# Patient Record
Sex: MALE | Race: WHITE | NOT HISPANIC OR LATINO | Employment: FULL TIME | URBAN - METROPOLITAN AREA
[De-identification: names, ages, dates, MRNs, and addresses within clinical notes are randomized per-mention and may not be internally consistent; named-entity substitution may affect disease eponyms.]

---

## 2023-04-28 LAB
ALANINE AMINOTRANSFERASE (SGPT) (U/L) IN SER/PLAS: 21 U/L (ref 10–52)
ALBUMIN (G/DL) IN SER/PLAS: 4.4 G/DL (ref 3.4–5)
ALKALINE PHOSPHATASE (U/L) IN SER/PLAS: 86 U/L (ref 33–120)
ANION GAP IN SER/PLAS: 10 MMOL/L (ref 10–20)
ASPARTATE AMINOTRANSFERASE (SGOT) (U/L) IN SER/PLAS: 12 U/L (ref 9–39)
BILIRUBIN TOTAL (MG/DL) IN SER/PLAS: 1.2 MG/DL (ref 0–1.2)
CALCIUM (MG/DL) IN SER/PLAS: 9.3 MG/DL (ref 8.6–10.3)
CARBON DIOXIDE, TOTAL (MMOL/L) IN SER/PLAS: 30 MMOL/L (ref 21–32)
CHLORIDE (MMOL/L) IN SER/PLAS: 103 MMOL/L (ref 98–107)
CHOLESTEROL (MG/DL) IN SER/PLAS: 89 MG/DL (ref 0–199)
CHOLESTEROL IN HDL (MG/DL) IN SER/PLAS: 34.9 MG/DL
CHOLESTEROL/HDL RATIO: 2.6
CREATININE (MG/DL) IN SER/PLAS: 1 MG/DL (ref 0.5–1.3)
GFR MALE: 89 ML/MIN/1.73M2
GLUCOSE (MG/DL) IN SER/PLAS: 184 MG/DL (ref 74–99)
LDL: 36 MG/DL (ref 0–99)
POTASSIUM (MMOL/L) IN SER/PLAS: 4.4 MMOL/L (ref 3.5–5.3)
PROTEIN TOTAL: 6.5 G/DL (ref 6.4–8.2)
SODIUM (MMOL/L) IN SER/PLAS: 139 MMOL/L (ref 136–145)
TRIGLYCERIDE (MG/DL) IN SER/PLAS: 89 MG/DL (ref 0–149)
UREA NITROGEN (MG/DL) IN SER/PLAS: 12 MG/DL (ref 6–23)
VLDL: 18 MG/DL (ref 0–40)

## 2023-04-29 LAB
ESTIMATED AVERAGE GLUCOSE FOR HBA1C: 169 MG/DL
HEMOGLOBIN A1C/HEMOGLOBIN TOTAL IN BLOOD: 7.5 %

## 2023-10-05 DIAGNOSIS — E11.69 TYPE 2 DIABETES MELLITUS WITH OTHER SPECIFIED COMPLICATION, WITHOUT LONG-TERM CURRENT USE OF INSULIN (MULTI): ICD-10-CM

## 2023-10-06 RX ORDER — ATORVASTATIN CALCIUM 40 MG/1
40 TABLET, FILM COATED ORAL DAILY
Qty: 14 TABLET | Refills: 0 | Status: SHIPPED | OUTPATIENT
Start: 2023-10-06 | End: 2023-10-19 | Stop reason: SDUPTHER

## 2023-10-06 RX ORDER — SITAGLIPTIN 100 MG/1
100 TABLET, FILM COATED ORAL DAILY
Qty: 14 TABLET | Refills: 0 | Status: SHIPPED | OUTPATIENT
Start: 2023-10-06 | End: 2023-10-19 | Stop reason: SDUPTHER

## 2023-10-06 RX ORDER — METFORMIN HYDROCHLORIDE 500 MG/1
500 TABLET ORAL 2 TIMES DAILY
Qty: 28 TABLET | Refills: 0 | Status: SHIPPED | OUTPATIENT
Start: 2023-10-06 | End: 2023-10-19 | Stop reason: SDUPTHER

## 2023-10-19 RX ORDER — ATORVASTATIN CALCIUM 40 MG/1
40 TABLET, FILM COATED ORAL DAILY
Qty: 30 TABLET | Refills: 0 | Status: SHIPPED | OUTPATIENT
Start: 2023-10-19 | End: 2023-11-02 | Stop reason: SDUPTHER

## 2023-10-19 RX ORDER — METFORMIN HYDROCHLORIDE 500 MG/1
500 TABLET ORAL 2 TIMES DAILY
Qty: 60 TABLET | Refills: 0 | Status: SHIPPED | OUTPATIENT
Start: 2023-10-19 | End: 2023-11-02 | Stop reason: SDUPTHER

## 2023-11-02 ENCOUNTER — OFFICE VISIT (OUTPATIENT)
Dept: PRIMARY CARE | Facility: CLINIC | Age: 53
End: 2023-11-02
Payer: COMMERCIAL

## 2023-11-02 ENCOUNTER — LAB (OUTPATIENT)
Dept: LAB | Facility: LAB | Age: 53
End: 2023-11-02
Payer: COMMERCIAL

## 2023-11-02 VITALS
BODY MASS INDEX: 27.66 KG/M2 | SYSTOLIC BLOOD PRESSURE: 124 MMHG | DIASTOLIC BLOOD PRESSURE: 80 MMHG | WEIGHT: 204.2 LBS | OXYGEN SATURATION: 96 % | TEMPERATURE: 98 F | HEIGHT: 72 IN | HEART RATE: 90 BPM

## 2023-11-02 DIAGNOSIS — E78.5 HYPERLIPIDEMIA, UNSPECIFIED HYPERLIPIDEMIA TYPE: ICD-10-CM

## 2023-11-02 DIAGNOSIS — E11.9 TYPE 2 DIABETES MELLITUS WITHOUT COMPLICATION, WITHOUT LONG-TERM CURRENT USE OF INSULIN (MULTI): ICD-10-CM

## 2023-11-02 DIAGNOSIS — E11.69 TYPE 2 DIABETES MELLITUS WITH OTHER SPECIFIED COMPLICATION, WITHOUT LONG-TERM CURRENT USE OF INSULIN (MULTI): ICD-10-CM

## 2023-11-02 LAB
ALBUMIN SERPL BCP-MCNC: 4.7 G/DL (ref 3.4–5)
ALP SERPL-CCNC: 88 U/L (ref 33–120)
ALT SERPL W P-5'-P-CCNC: 34 U/L (ref 10–52)
ANION GAP SERPL CALC-SCNC: 14 MMOL/L (ref 10–20)
AST SERPL W P-5'-P-CCNC: 14 U/L (ref 9–39)
BILIRUB SERPL-MCNC: 1.1 MG/DL (ref 0–1.2)
BUN SERPL-MCNC: 15 MG/DL (ref 6–23)
CALCIUM SERPL-MCNC: 9.4 MG/DL (ref 8.6–10.3)
CHLORIDE SERPL-SCNC: 102 MMOL/L (ref 98–107)
CHOLEST SERPL-MCNC: 127 MG/DL (ref 0–199)
CHOLESTEROL/HDL RATIO: 3.4
CO2 SERPL-SCNC: 26 MMOL/L (ref 21–32)
CREAT SERPL-MCNC: 0.98 MG/DL (ref 0.5–1.3)
GFR SERPL CREATININE-BSD FRML MDRD: >90 ML/MIN/1.73M*2
GLUCOSE SERPL-MCNC: 192 MG/DL (ref 74–99)
HDLC SERPL-MCNC: 37.9 MG/DL
LDLC SERPL CALC-MCNC: 57 MG/DL
NON HDL CHOLESTEROL: 89 MG/DL (ref 0–149)
POC HEMOGLOBIN A1C: 6.7 % (ref 4.2–6.5)
POTASSIUM SERPL-SCNC: 4.2 MMOL/L (ref 3.5–5.3)
PROT SERPL-MCNC: 6.9 G/DL (ref 6.4–8.2)
SODIUM SERPL-SCNC: 138 MMOL/L (ref 136–145)
TRIGL SERPL-MCNC: 162 MG/DL (ref 0–149)
VLDL: 32 MG/DL (ref 0–40)

## 2023-11-02 PROCEDURE — 3074F SYST BP LT 130 MM HG: CPT | Performed by: FAMILY MEDICINE

## 2023-11-02 PROCEDURE — 80053 COMPREHEN METABOLIC PANEL: CPT

## 2023-11-02 PROCEDURE — 3048F LDL-C <100 MG/DL: CPT | Performed by: FAMILY MEDICINE

## 2023-11-02 PROCEDURE — 36415 COLL VENOUS BLD VENIPUNCTURE: CPT

## 2023-11-02 PROCEDURE — 83036 HEMOGLOBIN GLYCOSYLATED A1C: CPT | Performed by: FAMILY MEDICINE

## 2023-11-02 PROCEDURE — 3051F HG A1C>EQUAL 7.0%<8.0%: CPT | Performed by: FAMILY MEDICINE

## 2023-11-02 PROCEDURE — 80061 LIPID PANEL: CPT

## 2023-11-02 PROCEDURE — 3079F DIAST BP 80-89 MM HG: CPT | Performed by: FAMILY MEDICINE

## 2023-11-02 PROCEDURE — 99214 OFFICE O/P EST MOD 30 MIN: CPT | Performed by: FAMILY MEDICINE

## 2023-11-02 PROCEDURE — 1036F TOBACCO NON-USER: CPT | Performed by: FAMILY MEDICINE

## 2023-11-02 RX ORDER — METFORMIN HYDROCHLORIDE 500 MG/1
500 TABLET ORAL 2 TIMES DAILY
Qty: 60 TABLET | Refills: 2 | Status: SHIPPED | OUTPATIENT
Start: 2023-11-02 | End: 2024-02-07 | Stop reason: SDUPTHER

## 2023-11-02 RX ORDER — ATORVASTATIN CALCIUM 40 MG/1
40 TABLET, FILM COATED ORAL DAILY
Qty: 30 TABLET | Refills: 2 | Status: SHIPPED | OUTPATIENT
Start: 2023-11-02 | End: 2024-02-07 | Stop reason: SDUPTHER

## 2023-11-02 NOTE — PROGRESS NOTES
"Subjective   Patient ID: Seun Egan is a 53 y.o. male who presents for Med Refill (Is fasting for labs. ).    HPI Pt is here for refills of medications to treat the following medical conditions. Unless otherwise stated, these conditions are well-controlled, pt is taking medications s Rx, and there are no reported side effects to medications or other treatment: Hyperlipidemia, DM2.     States that he is having a flare up of a \"pinched nerve\" in the right shoulder/neck area. States that it came on yesterday, has gotten a little worse. Unsure how he got it. Denies heavy lifting, was off this week from work. States that he does exercises that his chiropractor gives him. Does not typically use any anti-inflammatories or muscle relaxers.     Review of Systems   All other systems reviewed and are negative.      Objective   /80   Pulse 90   Temp 36.7 °C (98 °F)   Ht 1.829 m (6')   Wt 92.6 kg (204 lb 3.2 oz)   SpO2 96%   BMI 27.69 kg/m²     Physical Exam  Vitals reviewed.   Constitutional:       General: He is awake.      Appearance: Normal appearance. He is well-developed.   HENT:      Head: Normocephalic and atraumatic.   Cardiovascular:      Rate and Rhythm: Normal rate.   Pulmonary:      Effort: Pulmonary effort is normal.   Musculoskeletal:      Cervical back: Neck supple. No spinous process tenderness. Decreased range of motion (to the right and left d/t pain in the right posterior neck muscles and trapezius).      Right lower leg: No edema.      Left lower leg: No edema.      Comments: There is a muscle spasm palpated in the right trapezius.   Skin:     General: Skin is warm and dry.      Findings: No lesion or rash.   Neurological:      General: No focal deficit present.      Mental Status: He is alert and oriented to person, place, and time.      Cranial Nerves: No facial asymmetry.      Motor: Motor function is intact.      Gait: Gait is intact.   Psychiatric:         Attention and Perception: " Attention normal.         Mood and Affect: Mood and affect normal.         Assessment/Plan   Diagnoses and all orders for this visit:  Type 2 diabetes mellitus without complication, without long-term current use of insulin (CMS/[facility])  -     Comprehensive metabolic panel; Future  -     Lipid panel; Future  -     POCT glycosylated hemoglobin (Hb A1C) manually resulted  Hyperlipidemia, unspecified hyperlipidemia type  -     Comprehensive metabolic panel; Future  -     Lipid panel; Future  Type 2 diabetes mellitus with other specified complication, without long-term current use of insulin (CMS/[facility])  -     SITagliptin phosphate (Januvia) 100 mg tablet; Take 1 tablet (100 mg) by mouth once daily.  -     metFORMIN (Glucophage) 500 mg tablet; Take 1 tablet (500 mg) by mouth 2 times a day.  -     atorvastatin (Lipitor) 40 mg tablet; Take 1 tablet (40 mg) by mouth once daily. as directed    A1c controlled - no changes to medications at this time. Medications refilled per protocol. Per pt, does not need anything at this time for his neck/shoulder such as anti-inflammatories or muscle relaxers.

## 2023-11-08 ENCOUNTER — TELEPHONE (OUTPATIENT)
Dept: PRIMARY CARE | Facility: CLINIC | Age: 53
End: 2023-11-08
Payer: COMMERCIAL

## 2023-11-08 NOTE — TELEPHONE ENCOUNTER
Called pt and LMOM letting him know that his labs were good. Let him know A1c is 6.7% and lipid panel and CMP are stable. Told him to call the office or download [employer] if he would like to know any specific numbers.

## 2024-02-07 ENCOUNTER — OFFICE VISIT (OUTPATIENT)
Dept: PRIMARY CARE | Facility: CLINIC | Age: 54
End: 2024-02-07
Payer: COMMERCIAL

## 2024-02-07 VITALS
BODY MASS INDEX: 28.28 KG/M2 | TEMPERATURE: 98.5 F | HEIGHT: 72 IN | WEIGHT: 208.8 LBS | OXYGEN SATURATION: 97 % | HEART RATE: 86 BPM | SYSTOLIC BLOOD PRESSURE: 126 MMHG | DIASTOLIC BLOOD PRESSURE: 74 MMHG

## 2024-02-07 DIAGNOSIS — E78.5 HYPERLIPIDEMIA, UNSPECIFIED HYPERLIPIDEMIA TYPE: ICD-10-CM

## 2024-02-07 DIAGNOSIS — E11.9 TYPE 2 DIABETES MELLITUS WITHOUT COMPLICATION, WITHOUT LONG-TERM CURRENT USE OF INSULIN (MULTI): ICD-10-CM

## 2024-02-07 LAB
ALBUMIN SERPL BCP-MCNC: 4.7 G/DL (ref 3.4–5)
ALP SERPL-CCNC: 87 U/L (ref 33–120)
ALT SERPL W P-5'-P-CCNC: 41 U/L (ref 10–52)
ANION GAP SERPL CALC-SCNC: 9 MMOL/L (ref 10–20)
AST SERPL W P-5'-P-CCNC: 19 U/L (ref 9–39)
BILIRUB SERPL-MCNC: 1 MG/DL (ref 0–1.2)
BUN SERPL-MCNC: 12 MG/DL (ref 6–23)
CALCIUM SERPL-MCNC: 9.3 MG/DL (ref 8.6–10.3)
CHLORIDE SERPL-SCNC: 103 MMOL/L (ref 98–107)
CHOLEST SERPL-MCNC: 121 MG/DL (ref 0–199)
CHOLESTEROL/HDL RATIO: 3.5
CO2 SERPL-SCNC: 31 MMOL/L (ref 21–32)
CREAT SERPL-MCNC: 1.03 MG/DL (ref 0.5–1.3)
EGFRCR SERPLBLD CKD-EPI 2021: 86 ML/MIN/1.73M*2
GLUCOSE SERPL-MCNC: 224 MG/DL (ref 74–99)
HDLC SERPL-MCNC: 34.6 MG/DL
LDLC SERPL CALC-MCNC: 52 MG/DL
NON HDL CHOLESTEROL: 86 MG/DL (ref 0–149)
POC HEMOGLOBIN A1C: 8.8 % (ref 4.2–6.5)
POTASSIUM SERPL-SCNC: 4.3 MMOL/L (ref 3.5–5.3)
PROT SERPL-MCNC: 6.5 G/DL (ref 6.4–8.2)
SODIUM SERPL-SCNC: 139 MMOL/L (ref 136–145)
TRIGL SERPL-MCNC: 173 MG/DL (ref 0–149)
VLDL: 35 MG/DL (ref 0–40)

## 2024-02-07 PROCEDURE — 3074F SYST BP LT 130 MM HG: CPT | Performed by: FAMILY MEDICINE

## 2024-02-07 PROCEDURE — 3078F DIAST BP <80 MM HG: CPT | Performed by: FAMILY MEDICINE

## 2024-02-07 PROCEDURE — 36415 COLL VENOUS BLD VENIPUNCTURE: CPT

## 2024-02-07 PROCEDURE — 99214 OFFICE O/P EST MOD 30 MIN: CPT | Performed by: FAMILY MEDICINE

## 2024-02-07 PROCEDURE — 80053 COMPREHEN METABOLIC PANEL: CPT

## 2024-02-07 PROCEDURE — 1036F TOBACCO NON-USER: CPT | Performed by: FAMILY MEDICINE

## 2024-02-07 PROCEDURE — 80061 LIPID PANEL: CPT

## 2024-02-07 PROCEDURE — 83036 HEMOGLOBIN GLYCOSYLATED A1C: CPT | Performed by: FAMILY MEDICINE

## 2024-02-07 RX ORDER — METFORMIN HYDROCHLORIDE 500 MG/1
500 TABLET ORAL 2 TIMES DAILY
Qty: 60 TABLET | Refills: 2 | Status: SHIPPED | OUTPATIENT
Start: 2024-02-07 | End: 2024-05-17 | Stop reason: SDUPTHER

## 2024-02-07 RX ORDER — ATORVASTATIN CALCIUM 40 MG/1
40 TABLET, FILM COATED ORAL DAILY
Qty: 30 TABLET | Refills: 2 | Status: SHIPPED | OUTPATIENT
Start: 2024-02-07 | End: 2024-05-17 | Stop reason: SDUPTHER

## 2024-02-07 NOTE — PROGRESS NOTES
Subjective   Patient ID: Seun Egan is a 53 y.o. male who presents for Med Refill.    HPI Pt is here for refills of medications to treat the following medical conditions. Unless otherwise stated, these conditions are well-controlled, pt is taking medications s Rx, and there are no reported side effects to medications or other treatment: DM2, hyperlipidemia. 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 States that when he picks work back up, he doesn't typically eat lunch. Usually only eats once per day at that time, so expects it will come back down again.     Review of Systems   All other systems reviewed and are negative.      Objective   /74   Pulse 86   Temp 36.9 °C (98.5 °F)   Ht 1.829 m (6')   Wt 94.7 kg (208 lb 12.8 oz)   SpO2 97%   BMI 28.32 kg/m²     Physical Exam  Vitals reviewed.   Constitutional:       General: He is awake.      Appearance: Normal appearance. He is well-developed.   HENT:      Head: Normocephalic and atraumatic.   Cardiovascular:      Rate and Rhythm: Normal rate.   Pulmonary:      Effort: Pulmonary effort is normal.   Musculoskeletal:      Cervical back: Full passive range of motion without pain.      Right lower leg: No edema.      Left lower leg: No edema.   Skin:     General: Skin is warm and dry.      Findings: No lesion or rash.   Neurological:      General: No focal deficit present.      Mental Status: He is alert and oriented to person, place, and time.      Cranial Nerves: No facial asymmetry.      Motor: Motor function is intact.      Gait: Gait is intact.   Psychiatric:         Attention and Perception: Attention normal.         Mood and Affect: Mood and affect normal.         Assessment/Plan   Diagnoses and all orders for this visit:  Type 2 diabetes mellitus without complication, without long-term current  use of insulin (CMS/[facility])  -     Comprehensive metabolic panel  -     Lipid panel  -     POCT glycosylated hemoglobin (Hb A1C) manually resulted  Hyperlipidemia, unspecified hyperlipidemia type  -     Comprehensive metabolic panel  -     Lipid panel  Type 2 diabetes mellitus with other specified complication, without long-term current use of insulin (CMS/[facility])    A1c today is 8.8, this appears to be the highest it has been in recent years. Typically runs around 6.5%. Can likely be attributed to increased eating, particularly carbs and sugars, and more sedentary lifestyle while laid off. Advised pt of elevated level and let him know to make an attempt to decreased carbs and sugars, will recheck in 90 days. Suspect once pt returns to work, will see his A1c come down.

## 2024-05-17 ENCOUNTER — OFFICE VISIT (OUTPATIENT)
Dept: PRIMARY CARE | Facility: CLINIC | Age: 54
End: 2024-05-17
Payer: COMMERCIAL

## 2024-05-17 VITALS
HEART RATE: 89 BPM | OXYGEN SATURATION: 96 % | DIASTOLIC BLOOD PRESSURE: 80 MMHG | SYSTOLIC BLOOD PRESSURE: 140 MMHG | WEIGHT: 210 LBS | HEIGHT: 72 IN | BODY MASS INDEX: 28.44 KG/M2 | TEMPERATURE: 98 F

## 2024-05-17 DIAGNOSIS — E78.5 HYPERLIPIDEMIA, UNSPECIFIED HYPERLIPIDEMIA TYPE: ICD-10-CM

## 2024-05-17 DIAGNOSIS — E11.9 TYPE 2 DIABETES MELLITUS WITHOUT COMPLICATION, WITHOUT LONG-TERM CURRENT USE OF INSULIN (MULTI): ICD-10-CM

## 2024-05-17 LAB — POC HEMOGLOBIN A1C: 8.7 % (ref 4.2–6.5)

## 2024-05-17 PROCEDURE — 3048F LDL-C <100 MG/DL: CPT | Performed by: FAMILY MEDICINE

## 2024-05-17 PROCEDURE — 3077F SYST BP >= 140 MM HG: CPT | Performed by: FAMILY MEDICINE

## 2024-05-17 PROCEDURE — 99214 OFFICE O/P EST MOD 30 MIN: CPT | Performed by: FAMILY MEDICINE

## 2024-05-17 PROCEDURE — 3079F DIAST BP 80-89 MM HG: CPT | Performed by: FAMILY MEDICINE

## 2024-05-17 PROCEDURE — 83036 HEMOGLOBIN GLYCOSYLATED A1C: CPT | Performed by: FAMILY MEDICINE

## 2024-05-17 RX ORDER — ATORVASTATIN CALCIUM 40 MG/1
40 TABLET, FILM COATED ORAL DAILY
Qty: 30 TABLET | Refills: 2 | Status: SHIPPED | OUTPATIENT
Start: 2024-05-17 | End: 2024-08-15

## 2024-05-17 RX ORDER — METFORMIN HYDROCHLORIDE 500 MG/1
500 TABLET ORAL 2 TIMES DAILY
Qty: 60 TABLET | Refills: 2 | Status: SHIPPED | OUTPATIENT
Start: 2024-05-17 | End: 2024-08-15

## 2024-05-17 NOTE — PROGRESS NOTES
Subjective   Patient ID: Seun Egan is a 54 y.o. male who presents for Med Refill.    HPI Pt is here for refills of medications to treat the following medical conditions. Unless otherwise stated, these conditions are well-controlled, pt is taking medications s Rx, and there are no reported side effects to medications or other treatment: DM Type 2, Hyperlipidemia    Review of Systems   All other systems reviewed and are negative.      Objective   /80 (BP Location: Left arm, Patient Position: Sitting)   Pulse 89   Temp 36.7 °C (98 °F) (Oral)   Ht 1.829 m (6')   Wt 95.3 kg (210 lb)   SpO2 96% Comment: RA  BMI 28.48 kg/m²     Physical Exam  Constitutional:       Appearance: Normal appearance.   Eyes:      Conjunctiva/sclera: Conjunctivae normal.   Cardiovascular:      Rate and Rhythm: Normal rate and regular rhythm.   Pulmonary:      Effort: Pulmonary effort is normal.      Breath sounds: Normal breath sounds.   Abdominal:      Palpations: Abdomen is soft.   Musculoskeletal:         General: Normal range of motion.   Skin:     General: Skin is warm and dry.   Neurological:      Mental Status: He is alert.   Psychiatric:         Mood and Affect: Mood normal.         Assessment/Plan   Diagnoses and all orders for this visit:  Type 2 diabetes mellitus without complication, without long-term current use of insulin (Multi)  -     Comprehensive metabolic panel; Future  -     POCT glycosylated hemoglobin (Hb A1C) manually resulted  -     atorvastatin (Lipitor) 40 mg tablet; Take 1 tablet (40 mg) by mouth once daily. as directed  -     metFORMIN (Glucophage) 500 mg tablet; Take 1 tablet (500 mg) by mouth 2 times a day.  -     SITagliptin phosphate (Januvia) 100 mg tablet; Take 1 tablet (100 mg) by mouth once daily.  - A1C is again elevated over 8.0. This has been discussed with him. Plan will be if he is unable to get back into the low 7 or upper 6 range we will need to add or adjust medication.  Hyperlipidemia,  unspecified hyperlipidemia type  -     atorvastatin (Lipitor) 40 mg tablet; Take 1 tablet (40 mg) by mouth once daily. as directed     RTC 3 months

## 2024-08-19 ENCOUNTER — TELEPHONE (OUTPATIENT)
Dept: PRIMARY CARE | Facility: CLINIC | Age: 54
End: 2024-08-19

## 2024-08-19 DIAGNOSIS — E11.9 TYPE 2 DIABETES MELLITUS WITHOUT COMPLICATION, WITHOUT LONG-TERM CURRENT USE OF INSULIN (MULTI): ICD-10-CM

## 2024-08-19 DIAGNOSIS — E78.5 HYPERLIPIDEMIA, UNSPECIFIED HYPERLIPIDEMIA TYPE: ICD-10-CM

## 2024-08-19 RX ORDER — METFORMIN HYDROCHLORIDE 500 MG/1
500 TABLET ORAL 2 TIMES DAILY
Qty: 60 TABLET | Refills: 0 | Status: SHIPPED | OUTPATIENT
Start: 2024-08-19 | End: 2024-09-18

## 2024-08-19 RX ORDER — ATORVASTATIN CALCIUM 40 MG/1
40 TABLET, FILM COATED ORAL DAILY
Qty: 30 TABLET | Refills: 0 | Status: SHIPPED | OUTPATIENT
Start: 2024-08-19 | End: 2024-09-18

## 2024-08-19 NOTE — TELEPHONE ENCOUNTER
Patient's wife called stating she was unable to reach the office after multiple attempts per the .  Reviewed message logs and we were unable to locate a message.  Patient's wife was offered multiple appointment days and times which she states do not work for Seun as he works.  Discussed with TJS, we will send 30 days to pharmacy.  Patient will need to schedule for medication management and fasting lab work. tls

## 2024-08-20 RX ORDER — METFORMIN HYDROCHLORIDE 500 MG/1
500 TABLET ORAL 2 TIMES DAILY
Qty: 14 TABLET | Refills: 0 | OUTPATIENT
Start: 2024-08-20

## 2024-08-20 RX ORDER — ATORVASTATIN CALCIUM 40 MG/1
40 TABLET, FILM COATED ORAL DAILY
Qty: 30 TABLET | Refills: 0 | OUTPATIENT
Start: 2024-08-20

## 2024-08-20 RX ORDER — SITAGLIPTIN 100 MG/1
100 TABLET, FILM COATED ORAL DAILY
Qty: 30 TABLET | Refills: 0 | OUTPATIENT
Start: 2024-08-20

## 2024-08-27 DIAGNOSIS — E78.5 HYPERLIPIDEMIA, UNSPECIFIED HYPERLIPIDEMIA TYPE: ICD-10-CM

## 2024-08-27 DIAGNOSIS — E11.9 TYPE 2 DIABETES MELLITUS WITHOUT COMPLICATION, WITHOUT LONG-TERM CURRENT USE OF INSULIN (MULTI): ICD-10-CM

## 2024-12-16 ENCOUNTER — LAB (OUTPATIENT)
Dept: LAB | Facility: LAB | Age: 54
End: 2024-12-16
Payer: COMMERCIAL

## 2024-12-16 ENCOUNTER — APPOINTMENT (OUTPATIENT)
Dept: PRIMARY CARE | Facility: CLINIC | Age: 54
End: 2024-12-16
Payer: COMMERCIAL

## 2024-12-16 VITALS
DIASTOLIC BLOOD PRESSURE: 94 MMHG | SYSTOLIC BLOOD PRESSURE: 154 MMHG | OXYGEN SATURATION: 99 % | WEIGHT: 206 LBS | HEART RATE: 78 BPM | HEIGHT: 72 IN | RESPIRATION RATE: 22 BRPM | BODY MASS INDEX: 27.9 KG/M2 | TEMPERATURE: 97.3 F

## 2024-12-16 DIAGNOSIS — E11.9 TYPE 2 DIABETES MELLITUS WITHOUT COMPLICATION, WITHOUT LONG-TERM CURRENT USE OF INSULIN (MULTI): ICD-10-CM

## 2024-12-16 DIAGNOSIS — Z00.00 ANNUAL PHYSICAL EXAM: ICD-10-CM

## 2024-12-16 DIAGNOSIS — E78.5 HYPERLIPIDEMIA, UNSPECIFIED HYPERLIPIDEMIA TYPE: ICD-10-CM

## 2024-12-16 DIAGNOSIS — Z12.11 SCREENING FOR COLON CANCER: Primary | ICD-10-CM

## 2024-12-16 LAB
ALBUMIN SERPL BCP-MCNC: 4.7 G/DL (ref 3.4–5)
ALP SERPL-CCNC: 100 U/L (ref 33–120)
ALT SERPL W P-5'-P-CCNC: 33 U/L (ref 10–52)
ANION GAP SERPL CALC-SCNC: 15 MMOL/L (ref 10–20)
AST SERPL W P-5'-P-CCNC: 14 U/L (ref 9–39)
BILIRUB SERPL-MCNC: 0.9 MG/DL (ref 0–1.2)
BUN SERPL-MCNC: 21 MG/DL (ref 6–23)
CALCIUM SERPL-MCNC: 9.4 MG/DL (ref 8.6–10.3)
CHLORIDE SERPL-SCNC: 99 MMOL/L (ref 98–107)
CHOLEST SERPL-MCNC: 185 MG/DL (ref 0–199)
CHOLESTEROL/HDL RATIO: 4.9
CO2 SERPL-SCNC: 28 MMOL/L (ref 21–32)
CREAT SERPL-MCNC: 0.93 MG/DL (ref 0.5–1.3)
CREAT UR-MCNC: 111.4 MG/DL (ref 20–370)
EGFRCR SERPLBLD CKD-EPI 2021: >90 ML/MIN/1.73M*2
EST. AVERAGE GLUCOSE BLD GHB EST-MCNC: 197 MG/DL
GLUCOSE SERPL-MCNC: 316 MG/DL (ref 74–99)
HBA1C MFR BLD: 8.5 %
HDLC SERPL-MCNC: 37.7 MG/DL
LDLC SERPL CALC-MCNC: ABNORMAL MG/DL
MICROALBUMIN UR-MCNC: 56.5 MG/L
MICROALBUMIN/CREAT UR: 50.7 UG/MG CREAT
NON HDL CHOLESTEROL: 147 MG/DL (ref 0–149)
POTASSIUM SERPL-SCNC: 4.6 MMOL/L (ref 3.5–5.3)
PROT SERPL-MCNC: 6.8 G/DL (ref 6.4–8.2)
SODIUM SERPL-SCNC: 137 MMOL/L (ref 136–145)
TRIGL SERPL-MCNC: 480 MG/DL (ref 0–149)
VLDL: ABNORMAL

## 2024-12-16 PROCEDURE — 80061 LIPID PANEL: CPT

## 2024-12-16 PROCEDURE — 3008F BODY MASS INDEX DOCD: CPT

## 2024-12-16 PROCEDURE — 3077F SYST BP >= 140 MM HG: CPT

## 2024-12-16 PROCEDURE — 82570 ASSAY OF URINE CREATININE: CPT

## 2024-12-16 PROCEDURE — 3048F LDL-C <100 MG/DL: CPT

## 2024-12-16 PROCEDURE — 83036 HEMOGLOBIN GLYCOSYLATED A1C: CPT

## 2024-12-16 PROCEDURE — 82043 UR ALBUMIN QUANTITATIVE: CPT

## 2024-12-16 PROCEDURE — 99214 OFFICE O/P EST MOD 30 MIN: CPT

## 2024-12-16 PROCEDURE — 99396 PREV VISIT EST AGE 40-64: CPT

## 2024-12-16 PROCEDURE — 36415 COLL VENOUS BLD VENIPUNCTURE: CPT

## 2024-12-16 PROCEDURE — 1036F TOBACCO NON-USER: CPT

## 2024-12-16 PROCEDURE — 80053 COMPREHEN METABOLIC PANEL: CPT

## 2024-12-16 PROCEDURE — 3080F DIAST BP >= 90 MM HG: CPT

## 2024-12-16 RX ORDER — METFORMIN HYDROCHLORIDE 500 MG/1
500 TABLET ORAL 2 TIMES DAILY
Qty: 180 TABLET | Refills: 3 | Status: SHIPPED | OUTPATIENT
Start: 2024-12-16 | End: 2025-12-11

## 2024-12-16 RX ORDER — ATORVASTATIN CALCIUM 40 MG/1
40 TABLET, FILM COATED ORAL DAILY
Qty: 90 TABLET | Refills: 3 | Status: SHIPPED | OUTPATIENT
Start: 2024-12-16 | End: 2025-12-11

## 2024-12-16 SDOH — ECONOMIC STABILITY: FOOD INSECURITY: WITHIN THE PAST 12 MONTHS, YOU WORRIED THAT YOUR FOOD WOULD RUN OUT BEFORE YOU GOT MONEY TO BUY MORE.: NEVER TRUE

## 2024-12-16 SDOH — ECONOMIC STABILITY: FOOD INSECURITY: WITHIN THE PAST 12 MONTHS, THE FOOD YOU BOUGHT JUST DIDN'T LAST AND YOU DIDN'T HAVE MONEY TO GET MORE.: NEVER TRUE

## 2024-12-16 ASSESSMENT — ENCOUNTER SYMPTOMS
ENDOCRINE NEGATIVE: 1
NEUROLOGICAL NEGATIVE: 1
RESPIRATORY NEGATIVE: 1
CARDIOVASCULAR NEGATIVE: 1
GASTROINTESTINAL NEGATIVE: 1
HEMATOLOGIC/LYMPHATIC NEGATIVE: 1
OCCASIONAL FEELINGS OF UNSTEADINESS: 0
LOSS OF SENSATION IN FEET: 1
MUSCULOSKELETAL NEGATIVE: 1
DEPRESSION: 0
CONSTITUTIONAL NEGATIVE: 1
PSYCHIATRIC NEGATIVE: 1
EYES NEGATIVE: 1

## 2024-12-16 ASSESSMENT — LIFESTYLE VARIABLES
HOW MANY STANDARD DRINKS CONTAINING ALCOHOL DO YOU HAVE ON A TYPICAL DAY: 1 OR 2
HOW OFTEN DO YOU HAVE SIX OR MORE DRINKS ON ONE OCCASION: NEVER
AUDIT-C TOTAL SCORE: 1
HOW OFTEN DO YOU HAVE A DRINK CONTAINING ALCOHOL: MONTHLY OR LESS
SKIP TO QUESTIONS 9-10: 1

## 2024-12-16 ASSESSMENT — PATIENT HEALTH QUESTIONNAIRE - PHQ9
SUM OF ALL RESPONSES TO PHQ9 QUESTIONS 1 & 2: 0
1. LITTLE INTEREST OR PLEASURE IN DOING THINGS: NOT AT ALL
2. FEELING DOWN, DEPRESSED OR HOPELESS: NOT AT ALL

## 2024-12-16 ASSESSMENT — PAIN SCALES - GENERAL: PAINLEVEL_OUTOF10: 0-NO PAIN

## 2024-12-16 NOTE — PATIENT INSTRUCTIONS
Thank you for coming to see me today.  If you have any questions or concerns following our visit, please contact the office.  Phone: [phone]    Follow up with me in 3 months    1)  Complete fasting labs- I will reach out with results

## 2024-12-16 NOTE — PROGRESS NOTES
Subjective   Patient ID: Seun Egan is a 54 y.o. male who presents for Diabetes.    Diabetes       Seun presents to [facility] care and for an annual exam. He has no concerns at this time.  He has lab work ordered from previous PCP, which he plans to have completed after this visit.  He does not check his sugars daily- eats a well rounded diet and drinks plenty of water.  No concerns for anxiety and depression.  BP elevated in office today- normally runs under 140/90.  Follow up in 3 months and sooner as needed.     Review of Systems   Constitutional: Negative.    HENT: Negative.     Eyes: Negative.    Respiratory: Negative.     Cardiovascular: Negative.    Gastrointestinal: Negative.    Endocrine: Negative.    Genitourinary: Negative.    Musculoskeletal: Negative.    Skin: Negative.    Neurological: Negative.    Hematological: Negative.    Psychiatric/Behavioral: Negative.         Objective   BP (!) 154/94 (BP Location: Right arm, Patient Position: Sitting, BP Cuff Size: Adult long)   Pulse 78   Temp 36.3 °C (97.3 °F) (Temporal)   Resp 22   Ht 1.829 m (6')   Wt 93.4 kg (206 lb)   SpO2 99%   BMI 27.94 kg/m²     Physical Exam  HENT:      Right Ear: Tympanic membrane normal.      Left Ear: Tympanic membrane normal.   Cardiovascular:      Rate and Rhythm: Normal rate and regular rhythm.      Pulses: Normal pulses.      Heart sounds: Normal heart sounds.   Pulmonary:      Effort: Pulmonary effort is normal.      Breath sounds: Normal breath sounds.   Musculoskeletal:         General: Normal range of motion.   Skin:     General: Skin is warm and dry.      Capillary Refill: Capillary refill takes less than 2 seconds.   Neurological:      Mental Status: He is oriented to person, place, and time.   Psychiatric:         Mood and Affect: Mood normal.         Behavior: Behavior normal.         Thought Content: Thought content normal.         Judgment: Judgment normal.         Assessment/Plan   Problem List Items  Addressed This Visit             ICD-10-CM    DM2 (diabetes mellitus, type 2) (Multi) E11.9    Relevant Medications    metFORMIN (Glucophage) 500 mg tablet    atorvastatin (Lipitor) 40 mg tablet    SITagliptin phosphate (Januvia) 100 mg tablet    Hyperlipidemia, unspecified E78.5    Relevant Medications    atorvastatin (Lipitor) 40 mg tablet    Annual physical exam Z00.00     Other Visit Diagnoses         Codes    Screening for colon cancer    -  Primary Z12.11    Relevant Orders    Cologuard® colon cancer screening

## 2024-12-30 LAB — NONINV COLON CA DNA+OCC BLD SCRN STL QL: NEGATIVE

## 2025-03-17 ENCOUNTER — APPOINTMENT (OUTPATIENT)
Dept: PRIMARY CARE | Facility: CLINIC | Age: 55
End: 2025-03-17
Payer: COMMERCIAL

## 2025-03-17 VITALS
HEART RATE: 96 BPM | TEMPERATURE: 96.9 F | OXYGEN SATURATION: 97 % | DIASTOLIC BLOOD PRESSURE: 68 MMHG | HEIGHT: 72 IN | RESPIRATION RATE: 20 BRPM | BODY MASS INDEX: 28.53 KG/M2 | SYSTOLIC BLOOD PRESSURE: 138 MMHG | WEIGHT: 210.6 LBS

## 2025-03-17 DIAGNOSIS — E78.5 HYPERLIPIDEMIA, UNSPECIFIED HYPERLIPIDEMIA TYPE: ICD-10-CM

## 2025-03-17 DIAGNOSIS — E11.9 TYPE 2 DIABETES MELLITUS WITHOUT COMPLICATION, WITHOUT LONG-TERM CURRENT USE OF INSULIN (MULTI): ICD-10-CM

## 2025-03-17 PROCEDURE — 99214 OFFICE O/P EST MOD 30 MIN: CPT

## 2025-03-17 PROCEDURE — 1036F TOBACCO NON-USER: CPT

## 2025-03-17 PROCEDURE — 3075F SYST BP GE 130 - 139MM HG: CPT

## 2025-03-17 PROCEDURE — 3008F BODY MASS INDEX DOCD: CPT

## 2025-03-17 PROCEDURE — 3078F DIAST BP <80 MM HG: CPT

## 2025-03-17 RX ORDER — METFORMIN HYDROCHLORIDE 500 MG/1
500 TABLET ORAL 2 TIMES DAILY
Qty: 60 TABLET | Refills: 0 | Status: SHIPPED | OUTPATIENT
Start: 2025-03-17 | End: 2025-03-18 | Stop reason: SDUPTHER

## 2025-03-17 RX ORDER — ATORVASTATIN CALCIUM 40 MG/1
40 TABLET, FILM COATED ORAL DAILY
Qty: 90 TABLET | Refills: 3 | Status: SHIPPED | OUTPATIENT
Start: 2025-03-17 | End: 2026-03-12

## 2025-03-17 RX ORDER — METFORMIN HYDROCHLORIDE 500 MG/1
500 TABLET ORAL 2 TIMES DAILY
Qty: 180 TABLET | Refills: 3 | Status: SHIPPED | OUTPATIENT
Start: 2025-03-17 | End: 2025-03-17

## 2025-03-17 SDOH — ECONOMIC STABILITY: FOOD INSECURITY: WITHIN THE PAST 12 MONTHS, YOU WORRIED THAT YOUR FOOD WOULD RUN OUT BEFORE YOU GOT MONEY TO BUY MORE.: NEVER TRUE

## 2025-03-17 SDOH — ECONOMIC STABILITY: FOOD INSECURITY: WITHIN THE PAST 12 MONTHS, THE FOOD YOU BOUGHT JUST DIDN'T LAST AND YOU DIDN'T HAVE MONEY TO GET MORE.: NEVER TRUE

## 2025-03-17 ASSESSMENT — ENCOUNTER SYMPTOMS
CARDIOVASCULAR NEGATIVE: 1
HEMATOLOGIC/LYMPHATIC NEGATIVE: 1
ALLERGIC/IMMUNOLOGIC NEGATIVE: 1
PSYCHIATRIC NEGATIVE: 1
DEPRESSION: 0
GASTROINTESTINAL NEGATIVE: 1
MUSCULOSKELETAL NEGATIVE: 1
OCCASIONAL FEELINGS OF UNSTEADINESS: 0
ENDOCRINE NEGATIVE: 1
EYES NEGATIVE: 1
CONSTITUTIONAL NEGATIVE: 1
RESPIRATORY NEGATIVE: 1
NEUROLOGICAL NEGATIVE: 1
LOSS OF SENSATION IN FEET: 1

## 2025-03-17 ASSESSMENT — ANXIETY QUESTIONNAIRES
4. TROUBLE RELAXING: NOT AT ALL
6. BECOMING EASILY ANNOYED OR IRRITABLE: NOT AT ALL
GAD7 TOTAL SCORE: 0
IF YOU CHECKED OFF ANY PROBLEMS ON THIS QUESTIONNAIRE, HOW DIFFICULT HAVE THESE PROBLEMS MADE IT FOR YOU TO DO YOUR WORK, TAKE CARE OF THINGS AT HOME, OR GET ALONG WITH OTHER PEOPLE: NOT DIFFICULT AT ALL
2. NOT BEING ABLE TO STOP OR CONTROL WORRYING: NOT AT ALL
3. WORRYING TOO MUCH ABOUT DIFFERENT THINGS: NOT AT ALL
5. BEING SO RESTLESS THAT IT IS HARD TO SIT STILL: NOT AT ALL
1. FEELING NERVOUS, ANXIOUS, OR ON EDGE: NOT AT ALL
7. FEELING AFRAID AS IF SOMETHING AWFUL MIGHT HAPPEN: NOT AT ALL

## 2025-03-17 ASSESSMENT — PAIN SCALES - GENERAL: PAINLEVEL_OUTOF10: 0-NO PAIN

## 2025-03-17 ASSESSMENT — LIFESTYLE VARIABLES
HOW MANY STANDARD DRINKS CONTAINING ALCOHOL DO YOU HAVE ON A TYPICAL DAY: PATIENT DOES NOT DRINK
HOW OFTEN DO YOU HAVE A DRINK CONTAINING ALCOHOL: NEVER
AUDIT-C TOTAL SCORE: 0
HOW OFTEN DO YOU HAVE SIX OR MORE DRINKS ON ONE OCCASION: NEVER
SKIP TO QUESTIONS 9-10: 1

## 2025-03-17 ASSESSMENT — PATIENT HEALTH QUESTIONNAIRE - PHQ9
1. LITTLE INTEREST OR PLEASURE IN DOING THINGS: NOT AT ALL
2. FEELING DOWN, DEPRESSED OR HOPELESS: NOT AT ALL
SUM OF ALL RESPONSES TO PHQ9 QUESTIONS 1 & 2: 0

## 2025-03-17 NOTE — PROGRESS NOTES
Subjective   Patient ID: Seun Egan is a 54 y.o. male who presents for Follow-up (No concerns).    HPI     Seun presents for a follow up visit- he has no concerns at this time.  Blood work reviewed from December- A1c was 8.5. Placed new lab orders and put in referral to clinical pharmacy.  He endorses that he never checks his blood sugar and that his diet has not been the best.  Will reach out with lab work to discuss changes in medication.  Follow up in 6 months and sooner as needed.    Review of Systems   Constitutional: Negative.    HENT: Negative.     Eyes: Negative.    Respiratory: Negative.     Cardiovascular: Negative.    Gastrointestinal: Negative.    Endocrine: Negative.    Genitourinary: Negative.    Musculoskeletal: Negative.    Skin: Negative.    Allergic/Immunologic: Negative.    Neurological: Negative.    Hematological: Negative.    Psychiatric/Behavioral: Negative.         Objective   /68 (BP Location: Left arm, Patient Position: Sitting, BP Cuff Size: Large adult)   Pulse 96   Temp 36.1 °C (96.9 °F) (Temporal)   Resp 20   Ht 1.829 m (6')   Wt 95.5 kg (210 lb 9.6 oz)   SpO2 97%   BMI 28.56 kg/m²     Physical Exam  Cardiovascular:      Rate and Rhythm: Normal rate and regular rhythm.      Pulses: Normal pulses.      Heart sounds: Normal heart sounds.   Pulmonary:      Effort: Pulmonary effort is normal.      Breath sounds: Normal breath sounds.   Musculoskeletal:         General: Normal range of motion.   Skin:     General: Skin is warm and dry.      Capillary Refill: Capillary refill takes less than 2 seconds.   Neurological:      Mental Status: He is oriented to person, place, and time.   Psychiatric:         Mood and Affect: Mood normal.         Behavior: Behavior normal.         Thought Content: Thought content normal.         Judgment: Judgment normal.         Assessment/Plan   Problem List Items Addressed This Visit             ICD-10-CM    DM2 (diabetes mellitus, type 2)  (Multi) E11.9    Relevant Medications    atorvastatin (Lipitor) 40 mg tablet    metFORMIN (Glucophage) 500 mg tablet    SITagliptin phosphate (Januvia) 100 mg tablet    Other Relevant Orders    Hemoglobin A1C    Comprehensive Metabolic Panel    Referral to Clinical Pharmacy    Hyperlipidemia, unspecified E78.5    Relevant Medications    atorvastatin (Lipitor) 40 mg tablet

## 2025-03-17 NOTE — PATIENT INSTRUCTIONS
Thank you for coming to see me today.  If you have any questions or concerns following our visit, please contact the office.  Phone: [phone]    Follow up in 6 months    1)  Complete fasting labs- I will reach out with results

## 2025-03-18 ENCOUNTER — TELEPHONE (OUTPATIENT)
Dept: PRIMARY CARE | Facility: CLINIC | Age: 55
End: 2025-03-18
Payer: COMMERCIAL

## 2025-03-18 DIAGNOSIS — E11.9 TYPE 2 DIABETES MELLITUS WITHOUT COMPLICATION, WITHOUT LONG-TERM CURRENT USE OF INSULIN (MULTI): ICD-10-CM

## 2025-03-18 LAB
ALBUMIN SERPL-MCNC: 4.9 G/DL (ref 3.6–5.1)
ALP SERPL-CCNC: 102 U/L (ref 35–144)
ALT SERPL-CCNC: 22 U/L (ref 9–46)
ANION GAP SERPL CALCULATED.4IONS-SCNC: 12 MMOL/L (CALC) (ref 7–17)
AST SERPL-CCNC: 14 U/L (ref 10–35)
BILIRUB SERPL-MCNC: 1.3 MG/DL (ref 0.2–1.2)
BUN SERPL-MCNC: 17 MG/DL (ref 7–25)
CALCIUM SERPL-MCNC: 9.2 MG/DL (ref 8.6–10.3)
CHLORIDE SERPL-SCNC: 101 MMOL/L (ref 98–110)
CO2 SERPL-SCNC: 25 MMOL/L (ref 20–32)
CREAT SERPL-MCNC: 0.91 MG/DL (ref 0.7–1.3)
EGFRCR SERPLBLD CKD-EPI 2021: 99 ML/MIN/1.73M2
EST. AVERAGE GLUCOSE BLD GHB EST-MCNC: 209 MG/DL
EST. AVERAGE GLUCOSE BLD GHB EST-SCNC: 11.6 MMOL/L
GLUCOSE SERPL-MCNC: 269 MG/DL (ref 65–99)
HBA1C MFR BLD: 8.9 % OF TOTAL HGB
POTASSIUM SERPL-SCNC: 4.3 MMOL/L (ref 3.5–5.3)
PROT SERPL-MCNC: 7.1 G/DL (ref 6.1–8.1)
SODIUM SERPL-SCNC: 138 MMOL/L (ref 135–146)

## 2025-03-18 RX ORDER — METFORMIN HYDROCHLORIDE 1000 MG/1
1000 TABLET ORAL 2 TIMES DAILY
Qty: 60 TABLET | Refills: 3 | Status: SHIPPED | OUTPATIENT
Start: 2025-03-18 | End: 2025-07-16

## 2025-03-18 NOTE — TELEPHONE ENCOUNTER
----- Message from Melissa Streeter sent at 3/18/2025  9:41 AM EDT -----  Hemoglobin A1c still elevated at 8.9- I have increased your metformin and sent the updated prescription to your pharmacy.

## 2025-03-21 ENCOUNTER — TELEPHONE (OUTPATIENT)
Dept: PRIMARY CARE | Facility: CLINIC | Age: 55
End: 2025-03-21
Payer: COMMERCIAL

## 2025-03-24 ENCOUNTER — APPOINTMENT (OUTPATIENT)
Dept: PHARMACY | Facility: HOSPITAL | Age: 55
End: 2025-03-24
Payer: COMMERCIAL

## 2025-03-24 DIAGNOSIS — E11.9 TYPE 2 DIABETES MELLITUS WITHOUT COMPLICATION, WITHOUT LONG-TERM CURRENT USE OF INSULIN: ICD-10-CM

## 2025-03-24 RX ORDER — DEXTROSE 4 G
TABLET,CHEWABLE ORAL
Qty: 1 EACH | Refills: 0 | Status: SHIPPED | OUTPATIENT
Start: 2025-03-24

## 2025-03-24 RX ORDER — IBUPROFEN 200 MG
CAPSULE ORAL
Qty: 100 EACH | Refills: 11 | Status: SHIPPED | OUTPATIENT
Start: 2025-03-24

## 2025-03-24 RX ORDER — LANCETS
EACH MISCELLANEOUS
Qty: 100 EACH | Refills: 11 | Status: SHIPPED | OUTPATIENT
Start: 2025-03-24

## 2025-03-24 NOTE — PROGRESS NOTES
Pharmacist Clinic: Diabetes Management    Seun Egan is a 54 y.o. male was referred to Clinical Pharmacy Team for diabetes management.     Referring Provider: Melissa Streeter*  - Last visit with referring provider: 3/17/25  - Transitioning to Sophie Sarabia    Subjective     HPI    Current Diabetes Pharmacotherapy:    - Metformin 500 mg BID   - Has not increased to 1,000 mg BID yet  - Januvia 100 mg daily    Social History:  Current diet:   - Eats more in winter time  - Does not like veggies  - Cut down on bread  - Likes sweets   - Eats 1 meal a day  - 1 can of pop; sometimes regular or diet  - Has 2 liter in fridge     Current exercise:   - States he is in chair by candy in winter time  - Does more working in summer time ()    Eye exam for this year?: yes - 2025  Foot exam for this year?: no   - Notices numbness in big toe     Current monitoring regimen:   Patient is using: Nothing - has no supplies (sent to pharmacy today)  Type of CGM: N/A    Reported blood sugars:     Fasting: N/A    2-Hours Post Prandial: N/A    Any episodes of hypoglycemia? no    Adverse Effects: None   - Has noticed chest tightening and hard to breathe at times (recommended he go to ER if it keeps persisting or any associated arm pain/numbness; Also will message Janell to let her know this).    Objective     There were no vitals taken for this visit.    Allergies   Allergen Reactions    Penicillins Unknown       Historical Diabetes Pharmacotherapy:  None    SECONDARY PREVENTION  - Statin? Yes   - ACE-I/ARB? No  - Aspirin? No    Pertinent PMH Review:  - PMH of Pancreatitis: No  - PMH of Retinopathy: No  - PMH of Urinary Tract Infections: No  - PMH of Yeast Infections: No  - PMH of MTC/MEN2: No  - PMH of ASCVD: No  - PMH of Sleep Apnea: No  - UACR/EGFR in last year?: Yes   -   Albumin/Creatinine Ratio   Date Value Ref Range Status   12/16/2024 50.7 (H) <30.0 ug/mg Creat Final     Albumin/Creatine Ratio   Date Value Ref  Range Status   01/30/2023 7.4 0.0 - 30.0 ug/mg crt Final       Lab Review  Lab Results   Component Value Date    BILITOT 1.3 (H) 03/17/2025    CALCIUM 9.2 03/17/2025    CO2 25 03/17/2025     03/17/2025    CREATININE 0.91 03/17/2025    GLUCOSE 269 (H) 03/17/2025    ALKPHOS 102 03/17/2025    K 4.3 03/17/2025    PROT 7.1 03/17/2025     03/17/2025    AST 14 03/17/2025    ALT 22 03/17/2025    BUN 17 03/17/2025    ANIONGAP 12 03/17/2025    ALBUMIN 4.9 03/17/2025    GFRMALE 89 04/28/2023     Lab Results   Component Value Date    TRIG 480 (H) 12/16/2024    CHOL 185 12/16/2024    HDL 37.7 12/16/2024     Lab Results   Component Value Date    HGBA1C 8.9 (H) 03/17/2025    HGBA1C 8.5 (H) 12/16/2024    HGBA1C 8.7 (A) 05/17/2024     The 10-year ASCVD risk score (Joey GOFF, et al., 2019) is: 15%    Values used to calculate the score:      Age: 54 years      Sex: Male      Is Non- : No      Diabetic: Yes      Tobacco smoker: No      Systolic Blood Pressure: 138 mmHg      Is BP treated: No      HDL Cholesterol: 37.7 mg/dL      Total Cholesterol: 185 mg/dL    Drug Interactions:  - None    Affordability/Accessibility:  - CGM= $75/mo    Preferred Pharmacy:  - [facility]    Assessment/Plan   Problem List Items Addressed This Visit       DM2 (diabetes mellitus, type 2) (Multi)    Relevant Medications    blood-glucose meter misc    blood sugar diagnostic (Blood Glucose Test) strip    lancets misc    Other Relevant Orders    Referral to Clinical Pharmacy       ASSESSMENT:  Patients diabetes is uncontrolled with most recent A1c of 8.9%.     Patient referred for DM management. Has not increased Metformin from 500 mg BID to 1,000 mg BID yet. Plans on doing it after finishing 500 mg bottle which will be soon. Does not have supplies to check blood sugars; will sent to pharmacy today. UACR was elevated at last lab draw. If blood sugars are still running high, will consider adding next appointment. Will review  blood sugars next visit and keep things the same for now.    PLAN:  CONTINUE current DM medications  Education provided: check blood sugars twice daily (morning and 2 hours after dinner); A1c goals   Follow up with clinical pharmacist: 4/21/25 @ 3:40   Follow up with PCP: 10/27/25    Continue all meds under the continuation of care with the referring provider and clinical pharmacy team.    Thank you,  Cassie Hollins, PharmD  Clinical Pharmacy Specialist  [phone]  [email or web address]     Verbal consent to manage patient's drug therapy was obtained from the patient. They were informed they may decline to participate or withdraw from participation in pharmacy services at any time.

## 2025-03-31 DIAGNOSIS — M25.561 RIGHT KNEE PAIN, UNSPECIFIED CHRONICITY: ICD-10-CM

## 2025-04-02 ENCOUNTER — HOSPITAL ENCOUNTER (OUTPATIENT)
Dept: RADIOLOGY | Facility: HOSPITAL | Age: 55
Discharge: HOME | End: 2025-04-02
Payer: COMMERCIAL

## 2025-04-02 ENCOUNTER — OFFICE VISIT (OUTPATIENT)
Dept: ORTHOPEDIC SURGERY | Facility: HOSPITAL | Age: 55
End: 2025-04-02
Payer: COMMERCIAL

## 2025-04-02 VITALS — BODY MASS INDEX: 28.44 KG/M2 | WEIGHT: 210 LBS | HEIGHT: 72 IN

## 2025-04-02 DIAGNOSIS — R52 PAIN: ICD-10-CM

## 2025-04-02 DIAGNOSIS — M25.561 RIGHT KNEE PAIN, UNSPECIFIED CHRONICITY: ICD-10-CM

## 2025-04-02 DIAGNOSIS — S83.241A TEAR OF MEDIAL MENISCUS OF RIGHT KNEE, CURRENT, UNSPECIFIED TEAR TYPE, INITIAL ENCOUNTER: Primary | ICD-10-CM

## 2025-04-02 PROCEDURE — 73562 X-RAY EXAM OF KNEE 3: CPT | Mod: RT

## 2025-04-02 PROCEDURE — 3008F BODY MASS INDEX DOCD: CPT | Performed by: SPECIALIST

## 2025-04-02 PROCEDURE — 73560 X-RAY EXAM OF KNEE 1 OR 2: CPT | Mod: LT

## 2025-04-02 PROCEDURE — 99213 OFFICE O/P EST LOW 20 MIN: CPT | Performed by: SPECIALIST

## 2025-04-02 PROCEDURE — 99203 OFFICE O/P NEW LOW 30 MIN: CPT | Performed by: SPECIALIST

## 2025-04-02 RX ORDER — NAPROXEN 375 MG/1
375 TABLET ORAL
Qty: 60 TABLET | Refills: 0 | Status: SHIPPED | OUTPATIENT
Start: 2025-04-02 | End: 2025-05-02

## 2025-04-02 ASSESSMENT — ENCOUNTER SYMPTOMS
LOSS OF SENSATION IN FEET: 0
DEPRESSION: 0
OCCASIONAL FEELINGS OF UNSTEADINESS: 0

## 2025-04-02 NOTE — PROGRESS NOTES
NPV Right Knee Pain   XR DONE Today   Patient started feeling some pain in the knee about a week ago while golfing, patient is also a  going up and down ladders, no known injury to the area does states he feels some instability in the knee.has noticed clicking and popping in the knee.    GENERAL: A/Ox3, NAD. Appears healthy, well nourished  PSYCHIATRIC: Mood stable, appropriate memory recall  SKIN: no erythema, rashes, or ecchymoses     MUSCULOSKELETAL:  Laterality: Right knee Exam  - Alignment: neutral  - ROM:  0 - >130 deg, mild medial joint line pain with full flexion  - Effusion: Slight  - Strength: knee extension and flexion 5/5, EHL/PF/DF motor intact  - Palpation: TTP along posteromedial joint line  - Stability: Anterior/Posterior stable, varus/valgus stable  - Gait: normal  - Hip Exam: flexion to 100+ degrees, full extension, internal/external rotation adequate, and no pain with log roll  - Special Tests: positive Araceli with palpable click and pain at medial joint line     NEUROVASCULAR:  - Neurovascular Status: sensation intact to light touch distally  - Capillary refill brisk at extremities, Bilateral dorsalis pedis pulse 2+    RADIOGRAPHS: No acute abnormalities    ASSESSMENT: Right degenerative medial meniscus tear    PLAN: Will start conservatively with anti-inflammatories and a home range of motion program.  If pain persists or mechanical symptoms worsen would recommend an MRI of the right knee.  Patient will call.    This note was dictated using speech recognition software and was not corrected for spelling or grammatical errors

## 2025-04-21 ENCOUNTER — APPOINTMENT (OUTPATIENT)
Dept: PHARMACY | Facility: HOSPITAL | Age: 55
End: 2025-04-21
Payer: COMMERCIAL

## 2025-04-21 DIAGNOSIS — E11.9 TYPE 2 DIABETES MELLITUS WITHOUT COMPLICATION, WITHOUT LONG-TERM CURRENT USE OF INSULIN: ICD-10-CM

## 2025-04-21 NOTE — PROGRESS NOTES
Pharmacist Clinic: Diabetes Management    Seun Egan is a 54 y.o. male was referred to Clinical Pharmacy Team for diabetes management.     Referring Provider: Melissa Streeter*  - Last visit with referring provider: 3/17/25    Subjective     HPI    Current Diabetes Pharmacotherapy:    - Metformin 1,000 mg BID  - Januvia 100 mg daily    Social History:  Current diet:   - Eats more in winter time  - Does not like veggies  - Cut down on bread  - Likes sweets   - Eats 1 meal a day  - 1 can of pop; sometimes regular or diet  - Has 2 liter in fridge     Current exercise:   - States he is in chair by candy in winter time  - Does more working in summer time ()    Eye exam for this year?: yes -   Foot exam for this year?: no   - Notices numbness in big toe     Current monitoring regimen:   Patient is using: glucometer  Type of CGM: N/A    Reported blood sugars:     Fastin, 239, 250, 228, 246, 216, 211, 230, 235, 218, 233, 253, 210, 202, 210, 225, 207, 178, 168, 170, 231, 226, 229, 236, 215    2-Hours Post Prandial: 184, 323, 194, 235, 251, 260, 220, 195, 209, 172, 148, 174, 221, 237, 289, 134, 150, 184, 162, 213, 282, 226, 206    Any episodes of hypoglycemia? no    Adverse Effects: None    Objective     There were no vitals taken for this visit.    Allergies   Allergen Reactions    Penicillins Unknown       Historical Diabetes Pharmacotherapy:  None    SECONDARY PREVENTION  - Statin? Yes   - ACE-I/ARB? No  - Aspirin? No    Pertinent PMH Review:  - PMH of Pancreatitis: No  - PMH of Retinopathy: No  - PMH of Urinary Tract Infections: No  - PMH of Yeast Infections: No  - PMH of MTC/MEN2: No  - PMH of ASCVD: No  - PMH of Sleep Apnea: No  - UACR/EGFR in last year?: Yes   -   Albumin/Creatinine Ratio   Date Value Ref Range Status   2024 50.7 (H) <30.0 ug/mg Creat Final     Albumin/Creatine Ratio   Date Value Ref Range Status   2023 7.4 0.0 - 30.0 ug/mg crt Final       Lab Review  Lab Results    Component Value Date    BILITOT 1.3 (H) 03/17/2025    CALCIUM 9.2 03/17/2025    CO2 25 03/17/2025     03/17/2025    CREATININE 0.91 03/17/2025    GLUCOSE 269 (H) 03/17/2025    ALKPHOS 102 03/17/2025    K 4.3 03/17/2025    PROT 7.1 03/17/2025     03/17/2025    AST 14 03/17/2025    ALT 22 03/17/2025    BUN 17 03/17/2025    ANIONGAP 12 03/17/2025    ALBUMIN 4.9 03/17/2025    GFRMALE 89 04/28/2023   GFR = 99 (3/17/25)    Lab Results   Component Value Date    TRIG 480 (H) 12/16/2024    CHOL 185 12/16/2024    HDL 37.7 12/16/2024     Lab Results   Component Value Date    HGBA1C 8.9 (H) 03/17/2025    HGBA1C 8.5 (H) 12/16/2024    HGBA1C 8.7 (A) 05/17/2024     The 10-year ASCVD risk score (Joey GOFF, et al., 2019) is: 15%    Values used to calculate the score:      Age: 54 years      Sex: Male      Is Non- : No      Diabetic: Yes      Tobacco smoker: No      Systolic Blood Pressure: 138 mmHg      Is BP treated: No      HDL Cholesterol: 37.7 mg/dL      Total Cholesterol: 185 mg/dL    Drug Interactions:  - None    Affordability/Accessibility:  - CGM= $75/mo    Preferred Pharmacy:  - [facility]    Assessment/Plan   Problem List Items Addressed This Visit       DM2 (diabetes mellitus, type 2) (Multi)    Relevant Medications    empagliflozin (Jardiance) 10 mg tablet    Other Relevant Orders    Referral to Clinical Pharmacy     ASSESSMENT:  Patients diabetes is uncontrolled with most recent A1c of 8.9%.     Patient has started testing blood sugars twice daily. Seems to be running high in 200s morning and afternoon. Will have him start Jardiance 10 mg daily. He is afraid of injections but did discuss possibly starting him on GLP in future.     PLAN:  INCREASE Jardiance to 10 mg daily  Follow up with clinical pharmacist: 5/19/25 @ 3:40   Follow up with PCP: 10/27/25 with Sophie    Continue all meds under the continuation of care with the referring provider and clinical pharmacy team.    Thank  Cassie martin, PharmD  Clinical Pharmacy Specialist  [phone]  [email or web address]     Verbal consent to manage patient's drug therapy was obtained from the patient. They were informed they may decline to participate or withdraw from participation in pharmacy services at any time.

## 2025-05-16 NOTE — PROGRESS NOTES
Pharmacist Clinic: Diabetes Management    Seun Egan is a 55 y.o. male was referred to Clinical Pharmacy Team for diabetes management.     Referring Provider: Sophie Sarabia AP*  - Last visit with referring provider: 3/17/25    Subjective     HPI    Current Diabetes Pharmacotherapy:    - Metformin 1,000 mg BID  - Januvia 100 mg daily  - Jardiance 10 mg daily    Social History:  Current diet:   - Eats more in winter time  - Does not like veggies  - Cut down on bread  - Likes sweets   - Eats 1 meal a day  - 1 can of pop; sometimes regular or diet  - Has 2 liter in fridge     Current exercise:   - States he is in chair by candy in winter time  - Does more working in summer time ()    Eye exam for this year?: yes -   Foot exam for this year?: no   - Notices numbness in big toe     Current monitoring regimen:   Patient is using: glucometer  Type of CGM: N/A    Reported blood sugars:     Fastin, 161, 185, 221, 183, 190, 225, 113  - Snacks before bed (gummy bears; special K bars; nutrigrain bars; strawberries/melons)    2-Hours Post Prandial: 146, 184, 170, 227, 259, 172  - 2 hrs after dinner    Any episodes of hypoglycemia? no    Adverse Effects: None    Objective     There were no vitals taken for this visit.    Allergies   Allergen Reactions    Penicillins Unknown       Historical Diabetes Pharmacotherapy:  None    SECONDARY PREVENTION  - Statin? Yes   - ACE-I/ARB? No  - Aspirin? No    Pertinent PMH Review:  - PMH of Pancreatitis: No  - PMH of Retinopathy: No  - PMH of Urinary Tract Infections: No  - PMH of Yeast Infections: No  - PMH of MTC/MEN2: No  - PMH of ASCVD: No  - PMH of Sleep Apnea: No  - UACR/EGFR in last year?: Yes   -   Albumin/Creatinine Ratio   Date Value Ref Range Status   2024 50.7 (H) <30.0 ug/mg Creat Final     Albumin/Creatine Ratio   Date Value Ref Range Status   2023 7.4 0.0 - 30.0 ug/mg crt Final       Lab Review  Lab Results   Component Value Date    BILITOT  1.3 (H) 03/17/2025    CALCIUM 9.2 03/17/2025    CO2 25 03/17/2025     03/17/2025    CREATININE 0.91 03/17/2025    GLUCOSE 269 (H) 03/17/2025    ALKPHOS 102 03/17/2025    K 4.3 03/17/2025    PROT 7.1 03/17/2025     03/17/2025    AST 14 03/17/2025    ALT 22 03/17/2025    BUN 17 03/17/2025    ANIONGAP 12 03/17/2025    ALBUMIN 4.9 03/17/2025    GFRMALE 89 04/28/2023     Lab Results   Component Value Date    EGFR 99 03/17/2025      Lab Results   Component Value Date    TRIG 480 (H) 12/16/2024    CHOL 185 12/16/2024    HDL 37.7 12/16/2024     Lab Results   Component Value Date    HGBA1C 8.9 (H) 03/17/2025    HGBA1C 8.5 (H) 12/16/2024    HGBA1C 8.7 (A) 05/17/2024     The 10-year ASCVD risk score (Joey GOFF, et al., 2019) is: 16.3%    Values used to calculate the score:      Age: 55 years      Sex: Male      Is Non- : No      Diabetic: Yes      Tobacco smoker: No      Systolic Blood Pressure: 138 mmHg      Is BP treated: No      HDL Cholesterol: 37.7 mg/dL      Total Cholesterol: 185 mg/dL    Drug Interactions:  - None    Affordability/Accessibility:  - CGM= $75/mo    Preferred Pharmacy:  - [facility]    Assessment/Plan   Problem List Items Addressed This Visit       DM2 (diabetes mellitus, type 2) (Multi)    Relevant Medications    empagliflozin (Jardiance) 25 mg tablet    glipiZIDE XL (Glucotrol XL) 5 mg 24 hr tablet    Other Relevant Orders    Referral to Clinical Pharmacy       ASSESSMENT:  Patients diabetes is uncontrolled with most recent A1c of 8.9%.     Blood sugars are still pretty elevated in the mornings at 180. No hypoglycemia. Will increase Jardiance to 25 mg daily and also add on Glipizide XL 5 mg with dinner. He still would not like an injection at this time.    PLAN:  INCREASE Jardiance to 25 mg daily  START Glipizide XL 5 mg with dinner  CONTINUE all other DM medications  Follow up with clinical pharmacist: 6/17/25 @ 3:40   Follow up with PCP: 10/27/25    Continue all  meds under the continuation of care with the referring provider and clinical pharmacy team.    Thank you,  Cassie Hollins, PharmD  Clinical Pharmacy Specialist  [phone]  [email or web address]     Verbal consent to manage patient's drug therapy was obtained from the patient. They were informed they may decline to participate or withdraw from participation in pharmacy services at any time.

## 2025-05-19 ENCOUNTER — APPOINTMENT (OUTPATIENT)
Dept: PHARMACY | Facility: HOSPITAL | Age: 55
End: 2025-05-19
Payer: COMMERCIAL

## 2025-05-19 DIAGNOSIS — E11.9 TYPE 2 DIABETES MELLITUS WITHOUT COMPLICATION, WITHOUT LONG-TERM CURRENT USE OF INSULIN: ICD-10-CM

## 2025-05-19 RX ORDER — GLIPIZIDE 5 MG/1
5 TABLET, FILM COATED, EXTENDED RELEASE ORAL DAILY
Qty: 30 TABLET | Refills: 3 | Status: SHIPPED | OUTPATIENT
Start: 2025-05-19 | End: 2026-05-19

## 2025-06-16 NOTE — PROGRESS NOTES
Pharmacist Clinic: Diabetes Management    Seun Egan is a 55 y.o. male was referred to Clinical Pharmacy Team for diabetes management.     Referring Provider: Sophie Sarabia AP*  - Last visit with referring provider: 3/17/25    Subjective     HPI    Current Diabetes Pharmacotherapy:    - Metformin 1,000 mg BID  - Januvia 100 mg daily  - Jardiance 25 mg daily  - Glipizide XL 5 mg with dinner    Social History:  Current diet:   - Eats more in winter time  - Does not like veggies  - Cut down on bread  - Likes sweets   - Eats 1 meal a day  - 1 can of pop; sometimes regular or diet  - Has 2 liter in fridge   - Snacks before bed (gummy bears; special K bars; nutrigrain bars; strawberries/melons)    Current exercise:   - States he is in chair by candy in winter time  - Does more working in summer time ()    Eye exam for this year?: yes -   Foot exam for this year?: no   - Notices numbness in big toe     Current monitoring regimen:   Patient is using: glucometer  Type of CGM: N/A    Checks blood sugars twice daily     Reported blood sugars:     Fastin, 129, 99, 203, 145, 118, 159, 104, 132, 125, 99, 120    2-Hours Post Prandial: 158, 93, 86, 210, 142, 149, 97, 109, 140, 118, 120, 215, 152, 160    Any episodes of hypoglycemia? no    Adverse Effects: None    Objective     There were no vitals taken for this visit.    Allergies   Allergen Reactions    Penicillins Unknown       Historical Diabetes Pharmacotherapy:  None    SECONDARY PREVENTION  - Statin? Yes   - ACE-I/ARB? No  - Aspirin? No    Pertinent PMH Review:  - PMH of Pancreatitis: No  - PMH of Retinopathy: No  - PMH of Urinary Tract Infections: No  - PMH of Yeast Infections: No  - PMH of MTC/MEN2: No  - PMH of ASCVD: No  - PMH of Sleep Apnea: No  - UACR/EGFR in last year?: Yes   -   Albumin/Creatinine Ratio   Date Value Ref Range Status   2024 50.7 (H) <30.0 ug/mg Creat Final     Albumin/Creatine Ratio   Date Value Ref Range Status    01/30/2023 7.4 0.0 - 30.0 ug/mg crt Final       Lab Review  Lab Results   Component Value Date    BILITOT 1.3 (H) 03/17/2025    CALCIUM 9.2 03/17/2025    CO2 25 03/17/2025     03/17/2025    CREATININE 0.91 03/17/2025    GLUCOSE 269 (H) 03/17/2025    ALKPHOS 102 03/17/2025    K 4.3 03/17/2025    PROT 7.1 03/17/2025     03/17/2025    AST 14 03/17/2025    ALT 22 03/17/2025    BUN 17 03/17/2025    ANIONGAP 12 03/17/2025    ALBUMIN 4.9 03/17/2025    GFRMALE 89 04/28/2023     Lab Results   Component Value Date    EGFR 99 03/17/2025      Lab Results   Component Value Date    TRIG 480 (H) 12/16/2024    CHOL 185 12/16/2024    HDL 37.7 12/16/2024     Lab Results   Component Value Date    HGBA1C 8.9 (H) 03/17/2025    HGBA1C 8.5 (H) 12/16/2024    HGBA1C 8.7 (A) 05/17/2024     The 10-year ASCVD risk score (Joey GOFF, et al., 2019) is: 16.3%    Values used to calculate the score:      Age: 55 years      Sex: Male      Is Non- : No      Diabetic: Yes      Tobacco smoker: No      Systolic Blood Pressure: 138 mmHg      Is BP treated: No      HDL Cholesterol: 37.7 mg/dL      Total Cholesterol: 185 mg/dL    Drug Interactions:  - None    Affordability/Accessibility:  - CGM= $75/mo    Preferred Pharmacy:  - [facility]    Assessment/Plan   Problem List Items Addressed This Visit       DM2 (diabetes mellitus, type 2) (Multi)    Relevant Orders    Hemoglobin A1c    Referral to Clinical Pharmacy     ASSESSMENT:  Patients diabetes is uncontrolled with most recent A1c of 8.9%.     Patients blood sugars have been looking a lot better since increasing jardiance and adding glipizide. No lows reported. Some #'s of 86-90 2 hrs PP. Will keep eye on and decrease Glipizide to 2.5 mg if needed. Otherwise will have him try to get an A1c done.      PLAN:  CONTINUE all DM medications  Follow up with clinical pharmacist: 7/15/25 @ 3:40   Follow up with PCP: 10/27/25    Continue all meds under the continuation of care  with the referring provider and clinical pharmacy team.    Thank you,  Cassie Hollins, PharmD  Clinical Pharmacy Specialist  [phone]  [email or web address]     Verbal consent to manage patient's drug therapy was obtained from the patient. They were informed they may decline to participate or withdraw from participation in pharmacy services at any time.

## 2025-06-17 ENCOUNTER — APPOINTMENT (OUTPATIENT)
Dept: PHARMACY | Facility: HOSPITAL | Age: 55
End: 2025-06-17
Payer: COMMERCIAL

## 2025-06-17 DIAGNOSIS — E11.9 TYPE 2 DIABETES MELLITUS WITHOUT COMPLICATION, WITHOUT LONG-TERM CURRENT USE OF INSULIN: ICD-10-CM

## 2025-06-24 ENCOUNTER — TELEPHONE (OUTPATIENT)
Dept: PRIMARY CARE | Facility: CLINIC | Age: 55
End: 2025-06-24
Payer: COMMERCIAL

## 2025-06-24 NOTE — TELEPHONE ENCOUNTER
Previous [facility] patient seeing Sophie in October.    Patients spouse called  in and stated they do not receive enough test strips they do not alwayswork and patient uses extra but has enough lancets.     Patient would like a new script of a greater quantity. Spouse said patient receives only 50 a month.  (Script say should be 100)

## 2025-07-14 NOTE — PROGRESS NOTES
Pharmacist Clinic: Diabetes Management    Seun Egan is a 55 y.o. male was referred to Clinical Pharmacy Team for diabetes management.     Referring Provider: Sophie Sarabia AP*  - Last visit with referring provider: 3/17/25    Subjective     HPI    Current Diabetes Pharmacotherapy:    - Metformin 1,000 mg BID  - Januvia 100 mg daily  - Jardiance 25 mg daily  - Glipizide XL 5 mg with dinner    Social History:  Current diet:   - Eats more in winter time  - Does not like veggies  - Cut down on bread  - Likes sweets   - Eats 1 meal a day  - 1 can of pop; sometimes regular or diet  - Has 2 liter in fridge   - Snacks before bed (gummy bears; special K bars; nutrigrain bars; strawberries/melons)    Current exercise:   - States he is in chair by candy in winter time  - Does more working in summer time ()    Eye exam for this year?: yes -   Foot exam for this year?: no   - Notices numbness in big toe     Current monitoring regimen:   Patient is using: glucometer  Type of CGM: N/A    Checks blood sugars twice daily     Reported blood sugars:     Fastin, 136, 183, 94, 207, 106, 136, 106, 104   - Fruits before bed    2-Hours Post Prandial: 141, 73, 137, 173, 151, 153, 151    Any episodes of hypoglycemia? no    Adverse Effects: None    Objective     There were no vitals taken for this visit.    Allergies   Allergen Reactions    Penicillins Unknown       Historical Diabetes Pharmacotherapy:  None    SECONDARY PREVENTION  - Statin? Yes   - ACE-I/ARB? No  - Aspirin? No    Pertinent PMH Review:  - PMH of Pancreatitis: No  - PMH of Retinopathy: No  - PMH of Urinary Tract Infections: No  - PMH of Yeast Infections: No  - PMH of MTC/MEN2: No  - PMH of ASCVD: No  - PMH of Sleep Apnea: No  - UACR/EGFR in last year?: Yes   -   Albumin/Creatinine Ratio   Date Value Ref Range Status   2024 50.7 (H) <30.0 ug/mg Creat Final     Albumin/Creatine Ratio   Date Value Ref Range Status   2023 7.4 0.0 - 30.0  ug/mg crt Final       Lab Review  Lab Results   Component Value Date    BILITOT 1.3 (H) 03/17/2025    CALCIUM 9.2 03/17/2025    CO2 25 03/17/2025     03/17/2025    CREATININE 0.91 03/17/2025    GLUCOSE 269 (H) 03/17/2025    ALKPHOS 102 03/17/2025    K 4.3 03/17/2025    PROT 7.1 03/17/2025     03/17/2025    AST 14 03/17/2025    ALT 22 03/17/2025    BUN 17 03/17/2025    ANIONGAP 12 03/17/2025    ALBUMIN 4.9 03/17/2025    GFRMALE 89 04/28/2023     Lab Results   Component Value Date    EGFR 99 03/17/2025      Lab Results   Component Value Date    TRIG 480 (H) 12/16/2024    CHOL 185 12/16/2024    HDL 37.7 12/16/2024     Lab Results   Component Value Date    HGBA1C 8.9 (H) 03/17/2025    HGBA1C 8.5 (H) 12/16/2024    HGBA1C 8.7 (A) 05/17/2024     The 10-year ASCVD risk score (Joey GOFF, et al., 2019) is: 16.3%    Values used to calculate the score:      Age: 55 years      Sex: Male      Is Non- : No      Diabetic: Yes      Tobacco smoker: No      Systolic Blood Pressure: 138 mmHg      Is BP treated: No      HDL Cholesterol: 37.7 mg/dL      Total Cholesterol: 185 mg/dL    Drug Interactions:  - None    Affordability/Accessibility:  - CGM= $75/mo    Preferred Pharmacy:  - [facility]    Assessment/Plan   Problem List Items Addressed This Visit       DM2 (diabetes mellitus, type 2) (Multi)    Relevant Medications    SITagliptin phosphate (Januvia) 100 mg tablet    Other Relevant Orders    Referral to Clinical Pharmacy       ASSESSMENT:  Patients diabetes is uncontrolled with most recent A1c of 8.9%.     Blood sugars are stable. Some highs in morning but he contributes to eating fruit before bed. Educated to try to stick with berries as they are lower in natural sugar. I'd like him to get A1c done before we make any adjustments.     PLAN:  CONTINUE current DM medications  Follow up with clinical pharmacist: 8/15/25 @ 2:40   Follow up with PCP: 10/27/25    Continue all meds under the  continuation of care with the referring provider and clinical pharmacy team.    Thank you,  Cassie Hollins, PharmD  Clinical Pharmacy Specialist  [phone]  [email or web address]     Verbal consent to manage patient's drug therapy was obtained from the patient. They were informed they may decline to participate or withdraw from participation in pharmacy services at any time.

## 2025-07-15 ENCOUNTER — APPOINTMENT (OUTPATIENT)
Dept: PHARMACY | Facility: HOSPITAL | Age: 55
End: 2025-07-15
Payer: COMMERCIAL

## 2025-07-15 DIAGNOSIS — E11.9 TYPE 2 DIABETES MELLITUS WITHOUT COMPLICATION, WITHOUT LONG-TERM CURRENT USE OF INSULIN: ICD-10-CM

## 2025-07-30 DIAGNOSIS — E11.9 TYPE 2 DIABETES MELLITUS WITHOUT COMPLICATION, WITHOUT LONG-TERM CURRENT USE OF INSULIN: ICD-10-CM

## 2025-07-30 RX ORDER — METFORMIN HYDROCHLORIDE 1000 MG/1
1000 TABLET ORAL 2 TIMES DAILY
Qty: 60 TABLET | Refills: 0 | Status: SHIPPED | OUTPATIENT
Start: 2025-07-30

## 2025-07-30 NOTE — TELEPHONE ENCOUNTER
Med Refill metFORMIN (Glucophage) 1,000 mg tablet   [facility]     LOV 3/17/2025 KV  NOV 10/27/2025 MM     Is current PCP GWEN Rossi ? Will they be re-establishing with [facility]...

## 2025-08-04 ENCOUNTER — TELEPHONE (OUTPATIENT)
Dept: PRIMARY CARE | Facility: CLINIC | Age: 55
End: 2025-08-04
Payer: COMMERCIAL

## 2025-08-04 NOTE — TELEPHONE ENCOUNTER
Please let patient know that Sophie sent over a prescription in June for the test strips for twice a day testing and the prescription she has been sending into his pharmacy is  for 100 test strips at at time but it looks like the pharmacy is only giving him 50 test strips at a time. Can you check with his pharmacy and see why they are only dispensing 50 strips at a time (maybe it is an insurance issue)?  Thanks,  Vonda Almodovar, DO

## 2025-08-04 NOTE — TELEPHONE ENCOUNTER
Patient was with Iliana, he has an appointment with Sophie in October.  He keeps running out of testing strips because he was under the impression he should be testing twice a day.  Now he knows he should be doing it once a day.  Should he be doing it in the morning or evening?

## 2025-08-05 NOTE — TELEPHONE ENCOUNTER
I called and spoke with the pharmacist. She said that the insurance will only cover 50 for a 30 day period because it should last 25 days, while a 100 count would have many extra. She acknowledged that this leads to him running out early every month but his his insurance will not cover more.

## 2025-08-15 ENCOUNTER — APPOINTMENT (OUTPATIENT)
Dept: PHARMACY | Facility: HOSPITAL | Age: 55
End: 2025-08-15
Payer: COMMERCIAL

## 2025-08-15 DIAGNOSIS — E11.9 TYPE 2 DIABETES MELLITUS WITHOUT COMPLICATION, WITHOUT LONG-TERM CURRENT USE OF INSULIN: ICD-10-CM

## 2025-08-15 RX ORDER — GLIPIZIDE 5 MG/1
5 TABLET, FILM COATED, EXTENDED RELEASE ORAL DAILY
Qty: 30 TABLET | Refills: 5 | Status: SHIPPED | OUTPATIENT
Start: 2025-08-15 | End: 2026-08-15

## 2025-08-15 RX ORDER — METFORMIN HYDROCHLORIDE 1000 MG/1
1000 TABLET ORAL 2 TIMES DAILY
Qty: 180 TABLET | Refills: 3 | Status: SHIPPED | OUTPATIENT
Start: 2025-08-15

## 2025-08-15 RX ORDER — IBUPROFEN 200 MG
CAPSULE ORAL
Qty: 100 EACH | Refills: 11 | Status: SHIPPED | OUTPATIENT
Start: 2025-08-15

## 2025-09-19 ENCOUNTER — APPOINTMENT (OUTPATIENT)
Dept: PHARMACY | Facility: HOSPITAL | Age: 55
End: 2025-09-19
Payer: COMMERCIAL

## 2025-10-27 ENCOUNTER — APPOINTMENT (OUTPATIENT)
Dept: PRIMARY CARE | Facility: CLINIC | Age: 55
End: 2025-10-27
Payer: COMMERCIAL